# Patient Record
Sex: MALE | Race: WHITE | NOT HISPANIC OR LATINO | Employment: UNEMPLOYED | URBAN - METROPOLITAN AREA
[De-identification: names, ages, dates, MRNs, and addresses within clinical notes are randomized per-mention and may not be internally consistent; named-entity substitution may affect disease eponyms.]

---

## 2018-04-18 ENCOUNTER — OFFICE VISIT (OUTPATIENT)
Dept: URGENT CARE | Facility: CLINIC | Age: 33
End: 2018-04-18

## 2018-04-18 VITALS
OXYGEN SATURATION: 97 % | DIASTOLIC BLOOD PRESSURE: 68 MMHG | TEMPERATURE: 98.9 F | WEIGHT: 140 LBS | RESPIRATION RATE: 18 BRPM | HEART RATE: 110 BPM | HEIGHT: 72 IN | BODY MASS INDEX: 18.96 KG/M2 | SYSTOLIC BLOOD PRESSURE: 132 MMHG

## 2018-04-18 DIAGNOSIS — J34.0 CELLULITIS OF NOSE: Primary | ICD-10-CM

## 2018-04-18 PROCEDURE — G0382 LEV 3 HOSP TYPE B ED VISIT: HCPCS | Performed by: FAMILY MEDICINE

## 2018-04-18 RX ORDER — CEFTRIAXONE 1 G/1
1000 INJECTION, POWDER, FOR SOLUTION INTRAMUSCULAR; INTRAVENOUS EVERY 24 HOURS
Status: DISCONTINUED | OUTPATIENT
Start: 2018-04-18 | End: 2021-08-19

## 2018-04-18 RX ORDER — SULFAMETHOXAZOLE AND TRIMETHOPRIM 800; 160 MG/1; MG/1
1 TABLET ORAL EVERY 12 HOURS SCHEDULED
Qty: 20 TABLET | Refills: 0 | Status: SHIPPED | OUTPATIENT
Start: 2018-04-19 | End: 2018-04-29

## 2018-04-18 RX ORDER — BUPRENORPHINE HYDROCHLORIDE AND NALOXONE HYDROCHLORIDE DIHYDRATE 8; 2 MG/1; MG/1
TABLET SUBLINGUAL 3 TIMES DAILY
COMMUNITY
Start: 2018-03-27

## 2018-04-18 RX ORDER — DEXTROAMPHETAMINE SACCHARATE, AMPHETAMINE ASPARTATE, DEXTROAMPHETAMINE SULFATE AND AMPHETAMINE SULFATE 7.5; 7.5; 7.5; 7.5 MG/1; MG/1; MG/1; MG/1
30 TABLET ORAL 2 TIMES DAILY
COMMUNITY
Start: 2018-03-19

## 2018-04-18 RX ADMIN — CEFTRIAXONE 1000 MG: 1 INJECTION, POWDER, FOR SOLUTION INTRAMUSCULAR; INTRAVENOUS at 10:45

## 2018-04-18 NOTE — LETTER
April 18, 2018     Patient: Orlando Benitez   YOB: 1985   Date of Visit: 4/18/2018       To Whom it May Concern:    Orlando Benitez was seen in my clinic on 4/18/2018  He may return to work on 4/20/2018  If you have any questions or concerns, please don't hesitate to call           Sincerely,          Nita Beasley DO        CC: No Recipients

## 2018-04-18 NOTE — PATIENT INSTRUCTIONS
Take bactrim as directed  Ceftriaxone injection given in clinic  Return to clinic tomorrow for follow up and recheck  Go to the ER if sx worsen, develop fevers, SOB, trouble breathing, or eye pain

## 2018-04-18 NOTE — PROGRESS NOTES
[address] [employer] [employer] [facility]        NAME: Jesus Granger is a 29 y o  male  : 1985    MRN: [id number]  DATE: 2018  TIME: 10:43 AM    Assessment and Plan   Cellulitis of nose [J34 0]  1  Cellulitis of nose  cefTRIAXone (ROCEPHIN) injection 1,000 mg    sulfamethoxazole-trimethoprim (BACTRIM DS) 800-160 mg per tablet   Patient refused ER for IV abx  Wanted to attempt outpatient treatment with ceftriaxone injection given in clinic as well as bactrim  Vitals stable and patient afebrile  Aware of risk with hx of PCN allergy but patient agreed with benefit outweighing risk as he has never had PCN but was told by his mother as a child he was allergic  Keeping patient in clinic for 20 min after injection for observation  Patient with no SOB, dyspnea, wheezing, tongue swelling, chest pain, facial swelling or hives after 20 minutes  Patient Instructions     Take bactrim as directed  Ceftriaxone injection given in clinic  Return to clinic tomorrow for follow up and recheck  Go to the ER if sx worsen, develop fevers, SOB, trouble breathing, or eye pain  Chief Complaint     Chief Complaint   Patient presents with    Facial Swelling     Pt reports on Monday he noted a black area on his right nare  Last night his nose and right facial area became red, swollen and painful to touch  History of Present Illness       Patient presents with 3 day hx of nasal swelling and tenderness  He states Monday morning he woke up and had a small "pimple" at the right edge of his nose with a black spot in the middle of it  He didn't do anything for it and noticed the swelling was increasing by Monday night  He says the swelling was even worse this morning spreading to his face and under his right eye  He states the pain and swelling has been worsening ever since first noticing the lesion on Monday morning  He has been applying warm compresses to the area with no relief  He admits to headache and photophobia   He denies fevers or chills  He has an allergy to penicillin but is unable to state to what degree as he was told this by his mother and does not recall ever taking penicillin  Review of Systems   Review of Systems   Constitutional: Negative for chills and fever  Eyes: Positive for photophobia  Negative for visual disturbance  Respiratory: Negative for shortness of breath  Skin: Positive for color change  Nose is red and swollen   Neurological: Positive for headaches  Current Medications       Current Outpatient Prescriptions:     amphetamine-dextroamphetamine (ADDERALL) 10 mg tablet, , Disp: , Rfl:     buprenorphine-naloxone (SUBOXONE) 8-2 mg per SL tablet, , Disp: , Rfl:     [START ON 4/19/2018] sulfamethoxazole-trimethoprim (BACTRIM DS) 800-160 mg per tablet, Take 1 tablet by mouth every 12 (twelve) hours for 10 days, Disp: 20 tablet, Rfl: 0    Current Facility-Administered Medications:     cefTRIAXone (ROCEPHIN) injection 1,000 mg, 1,000 mg, Intravenous, Q24H, Panda Crane,     Current Allergies     Allergies as of 04/18/2018 - Reviewed 04/18/2018   Allergen Reaction Noted    Penicillins  04/18/2018            The following portions of the patient's history were reviewed and updated as appropriate: allergies, current medications, past family history, past medical history, past social history, past surgical history and problem list      No past medical history on file  No past surgical history on file  No family history on file  Medications have been verified  Objective   /68   Pulse (!) 110   Temp 98 9 °F (37 2 °C)   Resp 18   Ht 6' (1 829 m)   Wt 63 5 kg (140 lb)   SpO2 97%   BMI 18 99 kg/m²        Physical Exam     Physical Exam   Constitutional: He appears well-developed and well-nourished  No distress  HENT:   Nose with erythema and swelling R>L over external nose and edge of nare   Small area of eschar noted over the anterior edge of the right nostril  Very TTP over the right aspect of nose and into the area below the right eye  Mucosa normal  No pustular formation  Eyes: EOM are normal    Pulmonary/Chest: Effort normal and breath sounds normal  No respiratory distress  He has no wheezes  Post injection: no wheezing, SOB, dyspnea or stridor

## 2018-04-19 ENCOUNTER — OFFICE VISIT (OUTPATIENT)
Dept: URGENT CARE | Facility: CLINIC | Age: 33
End: 2018-04-19

## 2018-04-19 VITALS
OXYGEN SATURATION: 97 % | SYSTOLIC BLOOD PRESSURE: 128 MMHG | HEART RATE: 97 BPM | RESPIRATION RATE: 16 BRPM | TEMPERATURE: 98.7 F | DIASTOLIC BLOOD PRESSURE: 78 MMHG

## 2018-04-19 DIAGNOSIS — L03.211 CELLULITIS OF FACE: Primary | ICD-10-CM

## 2018-04-19 PROCEDURE — 99213 OFFICE O/P EST LOW 20 MIN: CPT | Performed by: FAMILY MEDICINE

## 2018-04-19 NOTE — PATIENT INSTRUCTIONS
Continue with your antibiotic  Warm compresses as needed  Use probiotics while on the antibiotic plus at least another week or 2  Do not hesitate to go to the emergency room if you are worsening

## 2018-04-19 NOTE — PROGRESS NOTES
Assessment/Plan:      Diagnoses and all orders for this visit:    Cellulitis of face          Subjective:     Patient ID: Brittney Pang is a 29 y o  male  This 21-year-old male was seen here yesterday and treated for cellulitis of the face  He believes that it all began on Monday of this week  When, he believes, a spider bit him on the right side of his nose  He noticed some redness on the left nostril on Tuesday  Yesterday the redness had extended up the right side of the nose and to just beneath the right eye  He presented here  He was treated aggressively with IM Rocephin and is now on Bactrim DS b i d  He is here for a recheck  The nurse who administered Rocephin yesterday assures me that he is significantly improved  Review of Systems   Constitutional: Negative  HENT: Negative  Eyes: Negative  Respiratory: Negative  Musculoskeletal: Negative  Skin:        See HPI         Objective:     Physical Exam   Constitutional: He is oriented to person, place, and time  He appears well-developed and well-nourished  HENT:   Head: Normocephalic and atraumatic  Pulmonary/Chest: Effort normal    Neurological: He is alert and oriented to person, place, and time  Skin: Skin is warm  There is an eschar at the a right nostril  Redness and swelling extends up the right side of the no loose and to just below the right eye

## 2021-03-10 ENCOUNTER — APPOINTMENT (OUTPATIENT)
Dept: URGENT CARE | Facility: CLINIC | Age: 36
End: 2021-03-10
Payer: OTHER MISCELLANEOUS

## 2021-03-10 PROCEDURE — G0382 LEV 3 HOSP TYPE B ED VISIT: HCPCS | Performed by: FAMILY MEDICINE

## 2021-03-10 PROCEDURE — 99283 EMERGENCY DEPT VISIT LOW MDM: CPT | Performed by: FAMILY MEDICINE

## 2021-03-12 ENCOUNTER — APPOINTMENT (OUTPATIENT)
Dept: URGENT CARE | Facility: CLINIC | Age: 36
End: 2021-03-12
Payer: OTHER MISCELLANEOUS

## 2021-03-12 PROCEDURE — 99213 OFFICE O/P EST LOW 20 MIN: CPT

## 2021-03-14 ENCOUNTER — OCCMED (OUTPATIENT)
Dept: URGENT CARE | Facility: CLINIC | Age: 36
End: 2021-03-14
Payer: OTHER MISCELLANEOUS

## 2021-03-14 DIAGNOSIS — Z56.89 WORK-RELATED CONDITION: Primary | ICD-10-CM

## 2021-03-14 PROCEDURE — 99213 OFFICE O/P EST LOW 20 MIN: CPT

## 2021-03-31 ENCOUNTER — APPOINTMENT (OUTPATIENT)
Dept: URGENT CARE | Facility: CLINIC | Age: 36
End: 2021-03-31
Payer: OTHER MISCELLANEOUS

## 2021-03-31 DIAGNOSIS — Z23 ENCOUNTER FOR IMMUNIZATION: ICD-10-CM

## 2021-03-31 PROCEDURE — 99213 OFFICE O/P EST LOW 20 MIN: CPT | Performed by: FAMILY MEDICINE

## 2021-04-03 ENCOUNTER — IMMUNIZATIONS (OUTPATIENT)
Dept: FAMILY MEDICINE CLINIC | Facility: HOSPITAL | Age: 36
End: 2021-04-03

## 2021-04-03 DIAGNOSIS — Z23 ENCOUNTER FOR IMMUNIZATION: Primary | ICD-10-CM

## 2021-04-03 PROCEDURE — 91300 SARS-COV-2 / COVID-19 MRNA VACCINE (PFIZER-BIONTECH) 30 MCG: CPT

## 2021-04-03 PROCEDURE — 0001A SARS-COV-2 / COVID-19 MRNA VACCINE (PFIZER-BIONTECH) 30 MCG: CPT

## 2021-04-14 ENCOUNTER — APPOINTMENT (EMERGENCY)
Dept: RADIOLOGY | Facility: HOSPITAL | Age: 36
End: 2021-04-14
Payer: COMMERCIAL

## 2021-04-14 ENCOUNTER — HOSPITAL ENCOUNTER (EMERGENCY)
Facility: HOSPITAL | Age: 36
Discharge: HOME | End: 2021-04-14
Attending: EMERGENCY MEDICINE | Admitting: EMERGENCY MEDICINE
Payer: COMMERCIAL

## 2021-04-14 VITALS
SYSTOLIC BLOOD PRESSURE: 137 MMHG | HEART RATE: 108 BPM | OXYGEN SATURATION: 98 % | RESPIRATION RATE: 18 BRPM | WEIGHT: 160 LBS | BODY MASS INDEX: 21.67 KG/M2 | HEIGHT: 72 IN | TEMPERATURE: 97 F | DIASTOLIC BLOOD PRESSURE: 90 MMHG

## 2021-04-14 DIAGNOSIS — Z20.822 SUSPECTED COVID-19 VIRUS INFECTION: Primary | ICD-10-CM

## 2021-04-14 LAB
FLUAV RNA RESP QL NAA+PROBE: NEGATIVE
FLUBV RNA RESP QL NAA+PROBE: NEGATIVE
RSV RNA RESP QL NAA+PROBE: NEGATIVE
SARS-COV-2 RNA RESP QL NAA+PROBE: NEGATIVE

## 2021-04-14 PROCEDURE — 99284 EMERGENCY DEPT VISIT MOD MDM: CPT | Performed by: PHYSICIAN ASSISTANT

## 2021-04-14 PROCEDURE — 71045 X-RAY EXAM CHEST 1 VIEW: CPT

## 2021-04-14 PROCEDURE — 0241U HB NFCT DS VIR RESP RNA 4 TRGT: CPT | Performed by: PHYSICIAN ASSISTANT

## 2021-04-14 PROCEDURE — 99283 EMERGENCY DEPT VISIT LOW MDM: CPT

## 2021-04-14 NOTE — ED PROVIDER NOTES
History  Chief Complaint   Patient presents with    Generalized Body Aches     Patient states that he has been feeling unwell for the past few days with a fever of 100 9, coughing and back aches     28 yo male w/ no pertinent PMH presents to the Emergency Department for evaluation of generalized myalgia, dry cough, fatigue and mild SOB x 2 days  Pt did receive 1/2 COVID vaccinations 10d ago  Intermittent fevers and chills, has not taken any antipyretics today  No hx of lung disease, does smoke intermittently  Two sons at home, ages 1 and 2, with similar respiratory symptoms  No known COVID + contacts  Prior to Admission Medications   Prescriptions Last Dose Informant Patient Reported? Taking? amphetamine-dextroamphetamine (ADDERALL) 10 mg tablet   Yes No   buprenorphine-naloxone (SUBOXONE) 8-2 mg per SL tablet   Yes No      Facility-Administered Medications Last Administration Doses Remaining   cefTRIAXone (ROCEPHIN) injection 1,000 mg 4/18/2018 10:45 AM           Past Medical History:   Diagnosis Date    History of substance use        History reviewed  No pertinent surgical history  History reviewed  No pertinent family history  I have reviewed and agree with the history as documented  E-Cigarette/Vaping     E-Cigarette/Vaping Substances     Social History     Tobacco Use    Smoking status: Current Every Day Smoker     Packs/day: 0 25     Types: Cigarettes    Smokeless tobacco: Never Used   Substance Use Topics    Alcohol use: No    Drug use: No       Review of Systems   Constitutional: Positive for fatigue and fever  Negative for chills and diaphoresis  Eyes: Negative for visual disturbance  Respiratory: Positive for cough and shortness of breath  Cardiovascular: Negative for chest pain and palpitations  Gastrointestinal: Negative for abdominal pain, diarrhea, nausea and vomiting  Genitourinary: Negative for dysuria, flank pain and frequency     Musculoskeletal: Positive for arthralgias and myalgias  Skin: Negative for color change, rash and wound  Allergic/Immunologic: Negative for immunocompromised state  Neurological: Negative for dizziness and light-headedness  Hematological: Does not bruise/bleed easily  Psychiatric/Behavioral: Negative for confusion  The patient is not nervous/anxious  Physical Exam  Physical Exam  Vitals signs and nursing note reviewed  Constitutional:       Appearance: He is well-developed  HENT:      Head: Normocephalic and atraumatic  Mouth/Throat:      Mouth: Mucous membranes are moist    Eyes:      Conjunctiva/sclera: Conjunctivae normal    Neck:      Musculoskeletal: Neck supple  Cardiovascular:      Rate and Rhythm: Regular rhythm  Tachycardia present  Heart sounds: No murmur  Pulmonary:      Effort: Pulmonary effort is normal  No respiratory distress  Breath sounds: Normal breath sounds  Abdominal:      Palpations: Abdomen is soft  Tenderness: There is no abdominal tenderness  Skin:     General: Skin is warm and dry  Capillary Refill: Capillary refill takes less than 2 seconds  Neurological:      Mental Status: He is alert  Psychiatric:         Mood and Affect: Mood normal          Behavior: Behavior normal          Vital Signs  ED Triage Vitals [04/14/21 1424]   Temperature Pulse Respirations Blood Pressure SpO2   (!) 97 °F (36 1 °C) (!) 108 18 137/90 98 %      Temp src Heart Rate Source Patient Position - Orthostatic VS BP Location FiO2 (%)   -- -- Sitting Right arm --      Pain Score       --           Vitals:    04/14/21 1424   BP: 137/90   Pulse: (!) 108   Patient Position - Orthostatic VS: Sitting         Visual Acuity      ED Medications  Medications - No data to display    Diagnostic Studies  Results Reviewed     Procedure Component Value Units Date/Time    COVID19, [id number] Collected: 04/14/21 1451    Lab Status:  In process Specimen: Nares from Nasopharyngeal Swab Updated: 04/14/21 1458                 XR chest 1 view portable   ED Interpretation by Nahun Caicedo PA-C (04/14 6049)   No acute disease      Final Result by Alfred Arroyo MD (04/14 3319)      No acute cardiopulmonary disease  Workstation performed: [id number]                    Procedures  Procedures         ED Course                             SBIRT 22yo+      Most Recent Value   SBIRT (25 yo +)   In order to provide better care to our patients, we are screening all of our patients for alcohol and drug use  Would it be okay to ask you these screening questions? No Filed at: 04/14/2021 1427                    [facility]  Number of Diagnoses or Management Options  Suspected COVID-19 virus infection: new and requires workup  Diagnosis management comments: CXR unremarkable, he is mildly tachycardic however VS otherwise unremarkable  Likely viral etiology given the multiple sick contacts within his home  Amount and/or Complexity of Data Reviewed  Tests in the radiology section of CPT®: ordered and reviewed  Review and summarize past medical records: yes  Independent visualization of images, tracings, or specimens: yes        Disposition  Final diagnoses:   Suspected COVID-19 virus infection     Time reflects when diagnosis was documented in both MDM as applicable and the Disposition within this note     Time User Action Codes Description Comment    4/14/2021  2:50 PM Chantell Page Add [Z13 902] Suspected COVID-19 virus infection       ED Disposition     ED Disposition Condition Date/Time Comment    Discharge Stable Wed Apr 14, 2021  2:50 PM Samy Tran discharge to home/self care              Follow-up Information     Follow up With Specialties Details Why Contact Info Additional Information     [address] Emergency Department Emergency Medicine  If symptoms worsen [address] [place],[address] [id number] [phone] [facility] Emergency Department, [address] [place], [place] [address]d Jeremy [address]    Timo Griffiths MD Obstetrics and Gynecology   [address]  [phone]             Discharge Medication List as of 4/14/2021  2:50 PM      CONTINUE these medications which have NOT CHANGED    Details   amphetamine-dextroamphetamine (ADDERALL) 10 mg tablet Starting Mon 3/19/2018, Historical Med      buprenorphine-naloxone (SUBOXONE) 8-2 mg per SL tablet Starting Tue 3/27/2018, Historical Med           No discharge procedures on file      PDMP Review     None          ED Provider  Electronically Signed by           Fariba Borjas PA-C  04/14/21 7389

## 2021-04-14 NOTE — DISCHARGE INSTRUCTIONS

## 2021-04-20 ENCOUNTER — APPOINTMENT (OUTPATIENT)
Dept: RADIOLOGY | Facility: CLINIC | Age: 36
End: 2021-04-20
Payer: COMMERCIAL

## 2021-04-20 VITALS
HEIGHT: 72 IN | WEIGHT: 154 LBS | DIASTOLIC BLOOD PRESSURE: 82 MMHG | SYSTOLIC BLOOD PRESSURE: 130 MMHG | BODY MASS INDEX: 20.86 KG/M2 | TEMPERATURE: 97.8 F

## 2021-04-20 DIAGNOSIS — M25.511 ACUTE PAIN OF RIGHT SHOULDER: ICD-10-CM

## 2021-04-20 DIAGNOSIS — M77.8 RIGHT SHOULDER TENDONITIS: Primary | ICD-10-CM

## 2021-04-20 DIAGNOSIS — M25.511 RIGHT SHOULDER PAIN, UNSPECIFIED CHRONICITY: ICD-10-CM

## 2021-04-20 PROCEDURE — 99203 OFFICE O/P NEW LOW 30 MIN: CPT | Performed by: ORTHOPAEDIC SURGERY

## 2021-04-20 PROCEDURE — 20610 DRAIN/INJ JOINT/BURSA W/O US: CPT | Performed by: ORTHOPAEDIC SURGERY

## 2021-04-20 PROCEDURE — 73030 X-RAY EXAM OF SHOULDER: CPT

## 2021-04-20 RX ORDER — BETAMETHASONE SODIUM PHOSPHATE AND BETAMETHASONE ACETATE 3; 3 MG/ML; MG/ML
6 INJECTION, SUSPENSION INTRA-ARTICULAR; INTRALESIONAL; INTRAMUSCULAR; SOFT TISSUE
Status: COMPLETED | OUTPATIENT
Start: 2021-04-20 | End: 2021-04-20

## 2021-04-20 RX ORDER — LIDOCAINE HYDROCHLORIDE 10 MG/ML
5 INJECTION, SOLUTION EPIDURAL; INFILTRATION; INTRACAUDAL; PERINEURAL
Status: COMPLETED | OUTPATIENT
Start: 2021-04-20 | End: 2021-04-20

## 2021-04-20 RX ADMIN — LIDOCAINE HYDROCHLORIDE 5 ML: 10 INJECTION, SOLUTION EPIDURAL; INFILTRATION; INTRACAUDAL; PERINEURAL at 10:48

## 2021-04-20 RX ADMIN — BETAMETHASONE SODIUM PHOSPHATE AND BETAMETHASONE ACETATE 6 MG: 3; 3 INJECTION, SUSPENSION INTRA-ARTICULAR; INTRALESIONAL; INTRAMUSCULAR; SOFT TISSUE at 10:48

## 2021-04-20 NOTE — ASSESSMENT & PLAN NOTE
After reviewing his imaging and performing a thorough history and physical exam, I explained to Jhonny Daugherty that he does appear to be suffering with right shoulder tendinitis  We reviewed his MRI together and I explained that his rotator cuff, biceps tendon, and labrum appear intact, but he does have some degenerative cyst developing over the anterior superior aspect of the humerus head which most consistent with his prior intermittent impingement symptoms  I did also offer him a corticosteroid injection for symptomatic relief  After discussing the risks and benefits, he elected to proceed  The injection was administered as documented below and was well tolerated by the patient  Patient appeared to have good pain relief after the injection  Post injection instructions were provided  We discussed treatment options and I recommended initiating formal therapy  I provided him with a referral for this today in the office  I also recommended he continue using over-the-counter NSAIDs and Voltaren gel as needed for pain  He may continue working on light duty status but should avoid repetitive right upper extremity activities, particularly overhead activities  A work note was provided for his employer  We will see him back in 4 weeks for repeat clinical evaluation  All patient's questions were answered to his satisfaction  This note is created using dictation transcription  It may contain typographical errors, grammatical errors, improperly dictated words, background noise and other errors

## 2021-04-20 NOTE — LETTER
April 20, 2021     Patient: Aury Kasper   YOB: 1985   Date of Visit: 4/20/2021       To Whom it May Concern:    Aury Kasper is under my professional care  He was seen in my office on 4/20/2021  He  may continue working on light duty status but should avoid repetitive activity his right upper extremity, especially overhead activity  If you have any questions or concerns, please don't hesitate to call           Sincerely,          Ivan Tyson MD        CC: No Recipients

## 2021-04-25 ENCOUNTER — IMMUNIZATIONS (OUTPATIENT)
Dept: FAMILY MEDICINE CLINIC | Facility: HOSPITAL | Age: 36
End: 2021-04-25

## 2021-04-25 DIAGNOSIS — Z23 ENCOUNTER FOR IMMUNIZATION: Primary | ICD-10-CM

## 2021-04-25 PROCEDURE — 0002A SARS-COV-2 / COVID-19 MRNA VACCINE (PFIZER-BIONTECH) 30 MCG: CPT

## 2021-04-25 PROCEDURE — 91300 SARS-COV-2 / COVID-19 MRNA VACCINE (PFIZER-BIONTECH) 30 MCG: CPT

## 2021-05-18 ENCOUNTER — OFFICE VISIT (OUTPATIENT)
Dept: URGENT CARE | Facility: CLINIC | Age: 36
End: 2021-05-18
Payer: COMMERCIAL

## 2021-05-18 VITALS
BODY MASS INDEX: 21.67 KG/M2 | HEART RATE: 91 BPM | SYSTOLIC BLOOD PRESSURE: 137 MMHG | OXYGEN SATURATION: 98 % | TEMPERATURE: 97.8 F | DIASTOLIC BLOOD PRESSURE: 79 MMHG | RESPIRATION RATE: 16 BRPM | WEIGHT: 160 LBS | HEIGHT: 72 IN

## 2021-05-18 DIAGNOSIS — S61.219A LACERATION WITHOUT FOREIGN BODY OF UNSPECIFIED FINGER WITHOUT DAMAGE TO NAIL, INITIAL ENCOUNTER: Primary | ICD-10-CM

## 2021-05-18 PROCEDURE — 90715 TDAP VACCINE 7 YRS/> IM: CPT

## 2021-05-18 PROCEDURE — 90471 IMMUNIZATION ADMIN: CPT | Performed by: PHYSICIAN ASSISTANT

## 2021-05-18 PROCEDURE — G0382 LEV 3 HOSP TYPE B ED VISIT: HCPCS | Performed by: PHYSICIAN ASSISTANT

## 2021-05-18 PROCEDURE — 12001 RPR S/N/AX/GEN/TRNK 2.5CM/<: CPT | Performed by: PHYSICIAN ASSISTANT

## 2021-05-18 RX ORDER — CLINDAMYCIN HYDROCHLORIDE 300 MG/1
300 CAPSULE ORAL 4 TIMES DAILY
Qty: 40 CAPSULE | Refills: 0 | Status: SHIPPED | OUTPATIENT
Start: 2021-05-18 | End: 2021-05-28

## 2021-05-18 NOTE — PROGRESS NOTES
[id number]        NAME: Jo Alamo is a 29 y o  male  : 1985    MRN: [id number]  DATE: May 18, 2021  TIME: 4:10 PM    /79   Pulse 91   Temp 97 8 °F (36 6 °C) (Tympanic)   Resp 16   Ht 6' (1 829 m)   Wt 72 6 kg (160 lb)   SpO2 98%   BMI 21 70 kg/m²     Assessment and Plan   Laceration without foreign body of unspecified finger without damage to nail, initial encounter [S61 219A]  1  Laceration without foreign body of unspecified finger without damage to nail, initial encounter  TDAP Vaccine greater than or equal to 6yo    clindamycin (CLEOCIN) 300 MG capsule         Patient Instructions       Follow up with PCP in 3-5 days  Proceed to  ER if symptoms worsen  Chief Complaint     Chief Complaint   Patient presents with    Finger Laceration     Pt reports lacerating left thumb on metal at [facility] today around 11:30AM  Last Tetanus unknown  History of Present Illness       Pt with left thumb laceration from sharp metal edge at store shelf      Review of Systems   Review of Systems   Constitutional: Negative  HENT: Negative  Eyes: Negative  Respiratory: Negative  Cardiovascular: Negative  Gastrointestinal: Negative  Endocrine: Negative  Genitourinary: Negative  Musculoskeletal: Negative  Skin: Negative  Allergic/Immunologic: Negative  Neurological: Negative  Hematological: Negative  Psychiatric/Behavioral: Negative  All other systems reviewed and are negative          Current Medications       Current Outpatient Medications:     amphetamine-dextroamphetamine (ADDERALL) 10 mg tablet, 30 mg , Disp: , Rfl:     buprenorphine-naloxone (SUBOXONE) 8-2 mg per SL tablet, 3 (three) times a day , Disp: , Rfl:     clindamycin (CLEOCIN) 300 MG capsule, Take 1 capsule (300 mg total) by mouth 4 (four) times a day for 10 days, Disp: 40 capsule, Rfl: 0    Current Facility-Administered Medications:     cefTRIAXone (ROCEPHIN) injection 1,000 mg, 1,000 mg, Intravenous, Q24H, Sangeeta Sauer DO, 1,000 mg at 04/18/18 1045    Current Allergies     Allergies as of 05/18/2021 - Reviewed 05/18/2021   Allergen Reaction Noted    Penicillins  04/18/2018            The following portions of the patient's history were reviewed and updated as appropriate: allergies, current medications, past family history, past medical history, past social history, past surgical history and problem list      Past Medical History:   Diagnosis Date    History of substance use        History reviewed  No pertinent surgical history  Family History   Problem Relation Age of Onset    Diabetes Mother     Hypertension Mother     Brain cancer Mother          Medications have been verified  Objective   /79   Pulse 91   Temp 97 8 °F (36 6 °C) (Tympanic)   Resp 16   Ht 6' (1 829 m)   Wt 72 6 kg (160 lb)   SpO2 98%   BMI 21 70 kg/m²          Physical Exam     Physical Exam  Vitals signs and nursing note reviewed  Constitutional:       Appearance: Normal appearance  He is normal weight  HENT:      Head: Normocephalic and atraumatic  Right Ear: Tympanic membrane, ear canal and external ear normal       Left Ear: Tympanic membrane, ear canal and external ear normal       Nose: Nose normal       Mouth/Throat:      Mouth: Mucous membranes are moist       Pharynx: Oropharynx is clear  Eyes:      Extraocular Movements: Extraocular movements intact  Conjunctiva/sclera: Conjunctivae normal       Pupils: Pupils are equal, round, and reactive to light  Neck:      Musculoskeletal: Normal range of motion  Cardiovascular:      Rate and Rhythm: Normal rate and regular rhythm  Pulses: Normal pulses  Heart sounds: Normal heart sounds  Pulmonary:      Effort: Pulmonary effort is normal       Breath sounds: Normal breath sounds  Abdominal:      General: Bowel sounds are normal       Palpations: Abdomen is soft  Musculoskeletal: Normal range of motion  Comments: Left thumb   25cm laceration superficial  Dip joint  Distal neuro and vascular wnl   Skin:     General: Skin is warm  Capillary Refill: Capillary refill takes less than 2 seconds  Neurological:      General: No focal deficit present  Mental Status: He is alert and oriented to person, place, and time  Psychiatric:         Mood and Affect: Mood normal          Behavior: Behavior normal            Laceration repair    Date/Time: 5/18/2021 4:08 PM  Performed by: Jori Beaulieu PA-C  Authorized by: Jori Beaulieu PA-C   Consent: Verbal consent obtained  Written consent not obtained  Consent given by: patient  Body area: upper extremity  Location details: left thumb  Laceration length: 0 3 cm  Foreign bodies: no foreign bodies  Tendon involvement: none  Nerve involvement: none  Vascular damage: no  Anesthesia: local infiltration    Anesthesia:  Local Anesthetic: lidocaine 1% without epinephrine  Anesthetic total: 1 mL    Sedation:  Patient sedated: no      Wound Dehiscence:  Superficial Wound Dehiscence: simple closure      Procedure Details:  Preparation: Patient was prepped and draped in the usual sterile fashion    Irrigation solution: saline  Irrigation method: syringe  Amount of cleaning: extensive  Debridement: none  Degree of undermining: none  Skin closure: 4-0 nylon  Number of sutures: 2  Technique: simple  Approximation: close  Approximation difficulty: simple  Dressing: 4x4 sterile gauze  Patient tolerance: patient tolerated the procedure well with no immediate complications

## 2021-05-18 NOTE — PATIENT INSTRUCTIONS
Finger Laceration   WHAT YOU NEED TO KNOW:   A finger laceration is a deep cut in your skin  Your blood vessels, bones, joints, tendons, or nerves may also be injured  DISCHARGE INSTRUCTIONS:   Return to the emergency department if:   · Your wound comes apart  · Blood soaks through your bandage  · You have severe pain in your finger or hand  · Your finger is pale and cold  · You have sudden trouble moving your finger  · Your swelling suddenly gets worse  · You have red streaks on your skin coming from your wound  Call your doctor or hand specialist if:   · You have new numbness or tingling  · Your finger feels warm, looks swollen or red, and is draining pus  · You have a fever  · You have questions or concerns about your condition or care  Medicines: You may  need any of the following:  · Antibiotics  help prevent a bacterial infection  · Acetaminophen  decreases pain and fever  It is available without a doctor's order  Ask how much to take and how often to take it  Follow directions  Read the labels of all other medicines you are using to see if they also contain acetaminophen, or ask your doctor or pharmacist  Acetaminophen can cause liver damage if not taken correctly  Do not use more than 4 grams (4,000 milligrams) total of acetaminophen in one day  · Prescription pain medicine  may be given  Ask your healthcare provider how to take this medicine safely  Some prescription pain medicines contain acetaminophen  Do not take other medicines that contain acetaminophen without talking to your healthcare provider  Too much acetaminophen may cause liver damage  Prescription pain medicine may cause constipation  Ask your healthcare provider how to prevent or treat constipation  · Take your medicine as directed  Contact your healthcare provider if you think your medicine is not helping or if you have side effects  Tell him or her if you are allergic to any medicine   Keep a list of the medicines, vitamins, and herbs you take  Include the amounts, and when and why you take them  Bring the list or the pill bottles to follow-up visits  Carry your medicine list with you in case of an emergency  Self-care:   · Apply ice  on your finger for 15 to 20 minutes every hour or as directed  Use an ice pack, or put crushed ice in a plastic bag  Cover it with a towel before you apply it to your skin  Ice helps prevent tissue damage and decreases swelling and pain  · Elevate  your hand above the level of your heart as often as you can  This will help decrease swelling and pain  Prop your hand on pillows or blankets to keep it elevated comfortably  · Wear your splint as directed  A splint will decrease movement and stress on your wound  The splint may help your wound heal faster  Ask your healthcare provider how to apply and remove a splint  · Apply ointments to decrease scarring  Do not apply ointments until your healthcare provider says it is okay  You may need to wait until your wound is healed  Ask which ointment to buy and how often to use it  Wound care:   · Do not get your wound wet until your healthcare provider says it is okay  Do not soak your hand in water  Do not go swimming until your healthcare provider says it is okay  When your healthcare provider says it is okay, carefully wash around the wound with soap and water  Let soap and water run over your wound  Gently pat the area dry or allow it to air dry  · Change your bandages when they get wet, dirty, or after washing  Apply new, clean bandages as directed  Do not apply elastic bandages or tape too tightly  Do not put powders or lotions on your wound  · Apply antibiotic ointment as directed  Your healthcare provider may give you antibiotic ointment to put over your wound if you have stitches  If you have Strips-Strips over your wound, let them dry up and fall off on their own   If they do not fall off within 14 days, gently remove them  If you have glue over your wound, do not remove or pick at it  If your glue comes off, do not replace it with glue that you have at home  · Check your wound every day for signs of infection  Signs of infection include swelling, redness, or pus  Follow up with your doctor or hand specialist in 2 days:  Write down your questions so you remember to ask them during your visits  © Copyright [address] Information is for End User's use only and may not be sold, redistributed or otherwise used for commercial purposes  All illustrations and images included in CareNotes® are the copyrighted property of [employer]  or [address] [place], [place] [address]   The above information is an  only  It is not intended as medical advice for individual conditions or treatments  Talk to your doctor, nurse or pharmacist before following any medical regimen to see if it is safe and effective for you

## 2021-06-09 ENCOUNTER — TELEPHONE (OUTPATIENT)
Dept: OBGYN CLINIC | Facility: OTHER | Age: 36
End: 2021-06-09

## 2021-06-09 NOTE — TELEPHONE ENCOUNTER
Harley Patton @ [facility] if requesting we fax Office Notes from 04-20      Fax # [phone]  Attn : Lawson Gant # [phone] , Harley Patton

## 2021-06-15 ENCOUNTER — OFFICE VISIT (OUTPATIENT)
Dept: OBGYN CLINIC | Facility: CLINIC | Age: 36
End: 2021-06-15
Payer: OTHER MISCELLANEOUS

## 2021-06-15 VITALS
DIASTOLIC BLOOD PRESSURE: 80 MMHG | BODY MASS INDEX: 21.26 KG/M2 | WEIGHT: 157 LBS | SYSTOLIC BLOOD PRESSURE: 130 MMHG | HEIGHT: 72 IN

## 2021-06-15 DIAGNOSIS — M77.8 RIGHT SHOULDER TENDONITIS: Primary | ICD-10-CM

## 2021-06-15 PROCEDURE — 99213 OFFICE O/P EST LOW 20 MIN: CPT | Performed by: ORTHOPAEDIC SURGERY

## 2021-06-15 NOTE — PROGRESS NOTES
Assessment:     1  Right shoulder tendonitis        Plan:  The patient was seen and examined by Dr Kiel Madrigal and myself  Problem List Items Addressed This Visit        Musculoskeletal and Integument    Right shoulder tendonitis - Primary       Findings consistent with resolved right shoulder tendinitis  Findings and treatment options were discussed with the patient  Patient is doing excellent  He has no pain with activities  He is doing his normal job with no issues  Continue full duty and follow up as needed if any problems arise  All questions were answered to patient's satisfaction  Subjective:      Patient ID: Jeffrey Mcclure is a 29 y o  male  Chief Complaint:    Meche Hill is a 64-year-old male who is following up for right shoulder tendinitis This is a worker's compensation case  He states that on March 7 he felt a pop in his shoulder while lifting heavy metal   He was evaluated 2 months ago and received a subacromial cortisone injection  He felt complete relief with the injection and continues to feel no pain  He did not attend formal physical therapy due to his work schedule  He has been working full duty with no increased pain  He has no pain with his normal daily activities as well  Allergy:  Allergies   Allergen Reactions    Penicillins      Medications:  all current active meds have been reviewed  Past Medical History:  Past Medical History:   Diagnosis Date    History of substance use      Past Surgical History:  History reviewed  No pertinent surgical history    Family History:  Family History   Problem Relation Age of Onset    Diabetes Mother     Hypertension Mother     Brain cancer Mother      Social History:  Social History     Substance and Sexual Activity   Alcohol Use No     Social History     Substance and Sexual Activity   Drug Use No     Social History     Tobacco Use   Smoking Status Current Every Day Smoker    Packs/day: 0 25    Types: Cigarettes   Smokeless Tobacco Never Used     Review of Systems   Constitutional: Positive for activity change  Negative for chills, fever and unexpected weight change  HENT: Negative for hearing loss, nosebleeds and sore throat  Eyes: Negative for pain, redness and visual disturbance  Respiratory: Negative for cough, shortness of breath and wheezing  Cardiovascular: Negative for chest pain, palpitations and leg swelling  Gastrointestinal: Negative for abdominal pain, nausea and vomiting  Endocrine: Negative for polyphagia and polyuria  Genitourinary: Negative for dysuria and hematuria  Musculoskeletal: Positive for arthralgias (Right shoulder) and myalgias  Negative for joint swelling and neck pain  See HPI   Skin: Negative for rash and wound  Neurological: Negative for dizziness, numbness and headaches  Psychiatric/Behavioral: Negative for decreased concentration and suicidal ideas  The patient is not nervous/anxious  Objective:  BP Readings from Last 1 Encounters:   06/15/21 130/80      Wt Readings from Last 1 Encounters:   06/15/21 71 2 kg (157 lb)      BMI:   Estimated body mass index is 21 29 kg/m² as calculated from the following:    Height as of this encounter: 6' (1 829 m)  Weight as of this encounter: 71 2 kg (157 lb)  BSA:   Estimated body surface area is 1 92 meters squared as calculated from the following:    Height as of this encounter: 6' (1 829 m)  Weight as of this encounter: 71 2 kg (157 lb)  Physical Exam  Vitals and nursing note reviewed  Constitutional:       Appearance: Normal appearance  He is well-developed  HENT:      Head: Normocephalic and atraumatic  Right Ear: External ear normal       Left Ear: External ear normal    Eyes:      Extraocular Movements: Extraocular movements intact  Conjunctiva/sclera: Conjunctivae normal    Pulmonary:      Effort: Pulmonary effort is normal    Musculoskeletal:      Cervical back: Neck supple     Skin:     General: Skin is warm  Neurological:      Mental Status: He is alert and oriented to person, place, and time  Psychiatric:         Mood and Affect: Mood normal          Behavior: Behavior normal        Right Shoulder Exam     Tenderness   The patient is experiencing no tenderness  Range of Motion   The patient has normal right shoulder ROM  Muscle Strength   Abduction: 5/5   Internal rotation: 5/5   External rotation: 5/5   Supraspinatus: 5/5   Biceps: 5/5     Tests   Impingement: negative  Drop arm: negative    Other   Erythema: absent  Scars: absent  Sensation: normal  Pulse: present    Comments:  Negative Speeds and empty can    No pain with resisted strength testing                No new imaging           Procedures

## 2021-06-15 NOTE — ASSESSMENT & PLAN NOTE
Findings consistent with resolved right shoulder tendinitis  Findings and treatment options were discussed with the patient  Patient is doing excellent  He has no pain with activities  He is doing his normal job with no issues  Continue full duty and follow up as needed if any problems arise  All questions were answered to patient's satisfaction

## 2021-06-16 ENCOUNTER — HOSPITAL ENCOUNTER (EMERGENCY)
Facility: HOSPITAL | Age: 36
Discharge: HOME | End: 2021-06-16
Attending: EMERGENCY MEDICINE | Admitting: EMERGENCY MEDICINE
Payer: COMMERCIAL

## 2021-06-16 VITALS
HEIGHT: 72 IN | RESPIRATION RATE: 16 BRPM | DIASTOLIC BLOOD PRESSURE: 80 MMHG | WEIGHT: 162 LBS | HEART RATE: 96 BPM | TEMPERATURE: 98 F | SYSTOLIC BLOOD PRESSURE: 124 MMHG | BODY MASS INDEX: 21.94 KG/M2 | OXYGEN SATURATION: 97 %

## 2021-06-16 DIAGNOSIS — M70.51 SUPRAPATELLAR BURSITIS OF RIGHT KNEE: Primary | ICD-10-CM

## 2021-06-16 LAB
ANION GAP SERPL CALCULATED.3IONS-SCNC: 6 MMOL/L (ref 4–13)
BASOPHILS # BLD AUTO: 0.02 THOUSANDS/ΜL (ref 0–0.1)
BASOPHILS NFR BLD AUTO: 0 % (ref 0–1)
BUN SERPL-MCNC: 11 MG/DL (ref 5–25)
CALCIUM SERPL-MCNC: 9.1 MG/DL (ref 8.3–10.1)
CHLORIDE SERPL-SCNC: 101 MMOL/L (ref 100–108)
CO2 SERPL-SCNC: 31 MMOL/L (ref 21–32)
CREAT SERPL-MCNC: 0.67 MG/DL (ref 0.6–1.3)
EOSINOPHIL # BLD AUTO: 0.14 THOUSAND/ΜL (ref 0–0.61)
EOSINOPHIL NFR BLD AUTO: 2 % (ref 0–6)
ERYTHROCYTE [DISTWIDTH] IN BLOOD BY AUTOMATED COUNT: 12.3 % (ref 11.6–15.1)
GFR SERPL CREATININE-BSD FRML MDRD: 124 ML/MIN/1.73SQ M
GLUCOSE SERPL-MCNC: 98 MG/DL (ref 65–140)
HCT VFR BLD AUTO: 36 % (ref 36.5–49.3)
HGB BLD-MCNC: 11.7 G/DL (ref 12–17)
IMM GRANULOCYTES # BLD AUTO: 0.02 THOUSAND/UL (ref 0–0.2)
IMM GRANULOCYTES NFR BLD AUTO: 0 % (ref 0–2)
LYMPHOCYTES # BLD AUTO: 1.91 THOUSANDS/ΜL (ref 0.6–4.47)
LYMPHOCYTES NFR BLD AUTO: 23 % (ref 14–44)
MCH RBC QN AUTO: 29.5 PG (ref 26.8–34.3)
MCHC RBC AUTO-ENTMCNC: 32.5 G/DL (ref 31.4–37.4)
MCV RBC AUTO: 91 FL (ref 82–98)
MONOCYTES # BLD AUTO: 0.92 THOUSAND/ΜL (ref 0.17–1.22)
MONOCYTES NFR BLD AUTO: 11 % (ref 4–12)
NEUTROPHILS # BLD AUTO: 5.44 THOUSANDS/ΜL (ref 1.85–7.62)
NEUTS SEG NFR BLD AUTO: 64 % (ref 43–75)
NRBC BLD AUTO-RTO: 0 /100 WBCS
PLATELET # BLD AUTO: 193 THOUSANDS/UL (ref 149–390)
PMV BLD AUTO: 9.5 FL (ref 8.9–12.7)
POTASSIUM SERPL-SCNC: 3.7 MMOL/L (ref 3.5–5.3)
RBC # BLD AUTO: 3.96 MILLION/UL (ref 3.88–5.62)
SODIUM SERPL-SCNC: 138 MMOL/L (ref 136–145)
URATE SERPL-MCNC: 3.7 MG/DL (ref 4.2–8)
WBC # BLD AUTO: 8.45 THOUSAND/UL (ref 4.31–10.16)

## 2021-06-16 PROCEDURE — 99284 EMERGENCY DEPT VISIT MOD MDM: CPT | Performed by: EMERGENCY MEDICINE

## 2021-06-16 PROCEDURE — 80048 BASIC METABOLIC PNL TOTAL CA: CPT | Performed by: EMERGENCY MEDICINE

## 2021-06-16 PROCEDURE — 99283 EMERGENCY DEPT VISIT LOW MDM: CPT

## 2021-06-16 PROCEDURE — 85025 COMPLETE CBC W/AUTO DIFF WBC: CPT | Performed by: EMERGENCY MEDICINE

## 2021-06-16 PROCEDURE — 36415 COLL VENOUS BLD VENIPUNCTURE: CPT | Performed by: EMERGENCY MEDICINE

## 2021-06-16 PROCEDURE — 84550 ASSAY OF BLOOD/URIC ACID: CPT | Performed by: EMERGENCY MEDICINE

## 2021-06-16 PROCEDURE — 96374 THER/PROPH/DIAG INJ IV PUSH: CPT

## 2021-06-16 RX ORDER — KETOROLAC TROMETHAMINE 30 MG/ML
30 INJECTION, SOLUTION INTRAMUSCULAR; INTRAVENOUS ONCE
Status: COMPLETED | OUTPATIENT
Start: 2021-06-16 | End: 2021-06-16

## 2021-06-16 RX ORDER — DICLOFENAC SODIUM AND MISOPROSTOL 75; 200 MG/1; UG/1
1 TABLET, DELAYED RELEASE ORAL 2 TIMES DAILY PRN
Qty: 30 TABLET | Refills: 0 | Status: SHIPPED | OUTPATIENT
Start: 2021-06-16 | End: 2021-08-19 | Stop reason: ALTCHOICE

## 2021-06-16 RX ADMIN — KETOROLAC TROMETHAMINE 30 MG: 30 INJECTION, SOLUTION INTRAMUSCULAR; INTRAVENOUS at 21:20

## 2021-06-16 NOTE — Clinical Note
Radha Diaz was seen and treated in our emergency department on 6/16/2021  No work until cleared by Orthopedics    Diagnosis:     Yesenia Sparks    He may return on this date: If you have any questions or concerns, please don't hesitate to call        Meng Jenkins, DO    ______________________________           _______________          _______________  Hospital Representative                              Date                                Time

## 2021-06-17 NOTE — ED PROVIDER NOTES
History  Chief Complaint   Patient presents with    Knee Swelling     Pt reports right knee swelling since monday, denies injury  took naproxen @ 1200 today  29y M here for evaluation of right knee pain  Denies any falls or injuries  First noted on Monday w/ some pain/swelling that increased a little throughout th eday  Pt works on his feet for most of a 12h shift w/ frequent up/down steps  Took some naproxen and felt better in the am   Again pain increased by the evening  Tried icing and took naproxen and again was better in the am   When it again increased throughout today, decided to come in for evaluation  Denies injury or inciting event  Doesn't recall hearing/feeling a pop  Does report generalized feeling of instability w/ stairs, but hasn't actually given out on him  Noted some faint redness and warmth above the knee tonight along w/ the swelling  Pain relatively constant all day, worse w/ weight bearing  Pain achy w/ increased pain w/ activity  No radiation of the pain  Some relief w/ naproxen  Denies any numbness or weakness, denies any hip or ankle pain  Denies previous injuries/surgeries to knee  Denies ivda or hx of mrsa  Pt currently on suboxone maintenance therapy tid for a hx of opiate abuse (pain meds)  No hx of gout or pseudo gout        History provided by:  Patient   used: No    Knee Pain  Location:  Knee  Time since incident:  3 days  Injury: no    Knee location:  R knee  Pain details:     Quality:  Aching and shooting    Radiates to:  Does not radiate    Severity:  Severe    Onset quality:  Gradual    Timing:  Constant    Progression:  Waxing and waning  Chronicity:  New  Dislocation: no    Prior injury to area:  No  Relieved by:  Nothing  Worsened by:  Bearing weight and activity  Ineffective treatments:  NSAIDs  Associated symptoms: decreased ROM and swelling    Associated symptoms: no back pain, no fatigue, no fever, no itching, no muscle weakness, no neck pain, no numbness, no stiffness and no tingling    Risk factors: no frequent fractures, no known bone disorder and no obesity        Prior to Admission Medications   Prescriptions Last Dose Informant Patient Reported? Taking? amphetamine-dextroamphetamine (ADDERALL) 10 mg tablet 2021 at Unknown time  Yes Yes   Si mg 2 (two) times a day    buprenorphine-naloxone (SUBOXONE) 8-2 mg per SL tablet 2021 at Unknown time  Yes Yes   Sig: 3 (three) times a day       Facility-Administered Medications Last Administration Doses Remaining   cefTRIAXone (ROCEPHIN) injection 1,000 mg 2018 10:45 AM           Past Medical History:   Diagnosis Date    History of substance use        History reviewed  No pertinent surgical history  Family History   Problem Relation Age of Onset    Diabetes Mother     Hypertension Mother     Brain cancer Mother      I have reviewed and agree with the history as documented  E-Cigarette/Vaping    E-Cigarette Use Never User      E-Cigarette/Vaping Substances     Social History     Tobacco Use    Smoking status: Current Every Day Smoker     Packs/day: 0 25     Types: Cigarettes    Smokeless tobacco: Never Used   Vaping Use    Vaping Use: Never used   Substance Use Topics    Alcohol use: No    Drug use: No       Review of Systems   Constitutional: Negative for fatigue and fever  Musculoskeletal: Negative for back pain, neck pain and stiffness  Skin: Negative for itching  All other systems reviewed and are negative  Physical Exam  Physical Exam  Vitals and nursing note reviewed  Constitutional:       Appearance: Normal appearance  HENT:      Nose: Nose normal    Eyes:      Conjunctiva/sclera: Conjunctivae normal    Cardiovascular:      Rate and Rhythm: Regular rhythm  Tachycardia present  Heart sounds: No murmur heard  Pulmonary:      Effort: Pulmonary effort is normal       Breath sounds: Normal breath sounds     Abdominal:      General: Abdomen is flat  Musculoskeletal:      Cervical back: Normal range of motion  Right knee: Effusion and erythema present  No bony tenderness or crepitus  Decreased range of motion  Tenderness present  No medial joint line, lateral joint line, MCL, LCL, ACL, PCL or patellar tendon tenderness  No LCL laxity, MCL laxity, ACL laxity or PCL laxity  Instability Tests: Anterior drawer test negative  Posterior drawer test negative  Anterior Lachman test negative  Left knee: Normal         Legs:    Skin:     General: Skin is warm  Capillary Refill: Capillary refill takes less than 2 seconds  Neurological:      General: No focal deficit present  Mental Status: He is alert and oriented to person, place, and time     Psychiatric:         Mood and Affect: Mood normal          Vital Signs  ED Triage Vitals [06/16/21 2058]   Temperature Pulse Respirations Blood Pressure SpO2   98 °F (36 7 °C) 89 20 107/81 100 %      Temp Source Heart Rate Source Patient Position - Orthostatic VS BP Location FiO2 (%)   Temporal Monitor Sitting Right arm --      Pain Score       9           Vitals:    06/16/21 2058 06/16/21 2100 06/16/21 2130   BP: 107/81 112/83 118/88   Pulse: 89 99 104   Patient Position - Orthostatic VS: Sitting Sitting Sitting         Visual Acuity      ED Medications  Medications   ketorolac (TORADOL) injection 30 mg (30 mg Intravenous Given 6/16/21 2120)       Diagnostic Studies  Results Reviewed     Procedure Component Value Units Date/Time    Basic metabolic panel [[id number]] Collected: 06/16/21 2120    Lab Status: Final result Specimen: Blood from Arm, Left Updated: 06/16/21 2148     Sodium 138 mmol/L      Potassium 3 7 mmol/L      Chloride 101 mmol/L      CO2 31 mmol/L      ANION GAP 6 mmol/L      BUN 11 mg/dL      Creatinine 0 67 mg/dL      Glucose 98 mg/dL      Calcium 9 1 mg/dL      eGFR 124 ml/min/1 73sq m     Narrative:      [place] guidelines for Chronic Kidney Disease (CKD):     Stage 1 with normal or high GFR (GFR > 90 mL/min/1 73 square meters)    Stage 2 Mild CKD (GFR = 60-89 mL/min/1 73 square meters)    Stage 3A Moderate CKD (GFR = 45-59 mL/min/1 73 square meters)    Stage 3B Moderate CKD (GFR = 30-44 mL/min/1 73 square meters)    Stage 4 Severe CKD (GFR = 15-29 mL/min/1 73 square meters)    Stage 5 End Stage CKD (GFR <15 mL/min/1 73 square meters)  Note: GFR calculation is accurate only with a steady state creatinine    Uric acid [[id number]]  (Abnormal) Collected: 06/16/21 2120    Lab Status: Final result Specimen: Blood from Arm, Left Updated: 06/16/21 2148     Uric Acid 3 7 mg/dL     CBC and differential [[id number]]  (Abnormal) Collected: 06/16/21 2120    Lab Status: Final result Specimen: Blood from Arm, Left Updated: 06/16/21 2131     WBC 8 45 Thousand/uL      RBC 3 96 Million/uL      Hemoglobin 11 7 g/dL      Hematocrit 36 0 %      MCV 91 fL      MCH 29 5 pg      MCHC 32 5 g/dL      RDW 12 3 %      MPV 9 5 fL      Platelets 270 Thousands/uL      nRBC 0 /100 WBCs      Neutrophils Relative 64 %      Immat GRANS % 0 %      Lymphocytes Relative 23 %      Monocytes Relative 11 %      Eosinophils Relative 2 %      Basophils Relative 0 %      Neutrophils Absolute 5 44 Thousands/µL      Immature Grans Absolute 0 02 Thousand/uL      Lymphocytes Absolute 1 91 Thousands/µL      Monocytes Absolute 0 92 Thousand/µL      Eosinophils Absolute 0 14 Thousand/µL      Basophils Absolute 0 02 Thousands/µL                  No orders to display              Procedures  Procedures         ED Course  ED Course as of Jun 16 2207   Wed Jun 16, 2021   2154 WBC: 8 45   2154 D/w pt bursitiis is likely inflammatory but could be infectious vs gout/pseudogout  Discussed possible aspiration of fluid and risk/benefit  Pt declines fluid aspiration at this time and would like to try knee immobilizer w/ ortho f/u    Will dc w/ immobilizer, nsaids and ortho f/u   URIC ACID(!): 3 7 SBIRT 20yo+      Most Recent Value   SBIRT (24 yo +)   In order to provide better care to our patients, we are screening all of our patients for alcohol and drug use  Would it be okay to ask you these screening questions? Yes Filed at: 06/16/2021 2102   Initial Alcohol Screen: US AUDIT-C    1  How often do you have a drink containing alcohol?  0 Filed at: 06/16/2021 2102   2  How many drinks containing alcohol do you have on a typical day you are drinking? 0 Filed at: 06/16/2021 2102   3a  Male UNDER 65: How often do you have five or more drinks on one occasion? 0 Filed at: 06/16/2021 2102   3b  FEMALE Any Age, or MALE 65+: How often do you have 4 or more drinks on one occassion? 0 Filed at: 06/16/2021 2102   Audit-C Score  0 Filed at: 06/16/2021 2102   JOHN: How many times in the past year have you    Used an illegal drug or used a prescription medication for non-medical reasons? Never Filed at: 06/16/2021 2102                    MDM  Number of Diagnoses or Management Options  Suprapatellar bursitis of right knee: new and requires workup     Amount and/or Complexity of Data Reviewed  Clinical lab tests: reviewed and ordered        Disposition  Final diagnoses:   Suprapatellar bursitis of right knee     Time reflects when diagnosis was documented in both MDM as applicable and the Disposition within this note     Time User Action Codes Description Comment    6/16/2021  9:56 PM Chana Queen Add [M70 51] Suprapatellar bursitis of right knee       ED Disposition     ED Disposition Condition Date/Time Comment    Discharge Stable Wed Jun 16, 2021  9:56 PM Aury Kasper discharge to home/self care              Follow-up Information     Follow up With Specialties Details Why Contact Info Additional [address] [facility] Call on 6/17/2021 to schedule a follow up appointment for further evaluation and treatment Akua Saldaña Sammie [facility] [phone] [address] [facility] [place], [address] [place] [address], [facility] [address]          Patient's Medications   Discharge Prescriptions    DICLOFENAC-MISOPROSTOL (ARTHROTEC 75) 75-0 2 MG PER TABLET    Take 1 tablet by mouth 2 (two) times a day as needed (pain)       Start Date: 6/16/2021 End Date: --       Order Dose: 1 tablet       Quantity: 30 tablet    Refills: 0     No discharge procedures on file      PDMP Review     None          ED Provider  Electronically Signed by           Reji Gutierrez DO  06/16/21 3946

## 2021-06-29 ENCOUNTER — APPOINTMENT (OUTPATIENT)
Dept: RADIOLOGY | Facility: CLINIC | Age: 36
End: 2021-06-29
Payer: COMMERCIAL

## 2021-06-29 ENCOUNTER — OFFICE VISIT (OUTPATIENT)
Dept: OBGYN CLINIC | Facility: CLINIC | Age: 36
End: 2021-06-29
Payer: COMMERCIAL

## 2021-06-29 VITALS
DIASTOLIC BLOOD PRESSURE: 80 MMHG | BODY MASS INDEX: 20.99 KG/M2 | WEIGHT: 155 LBS | HEIGHT: 72 IN | SYSTOLIC BLOOD PRESSURE: 118 MMHG

## 2021-06-29 DIAGNOSIS — M25.561 RIGHT KNEE PAIN, UNSPECIFIED CHRONICITY: ICD-10-CM

## 2021-06-29 DIAGNOSIS — M76.891 TENDINITIS OF RIGHT QUADRICEPS TENDON: Primary | ICD-10-CM

## 2021-06-29 PROCEDURE — 3008F BODY MASS INDEX DOCD: CPT | Performed by: ORTHOPAEDIC SURGERY

## 2021-06-29 PROCEDURE — 99213 OFFICE O/P EST LOW 20 MIN: CPT | Performed by: ORTHOPAEDIC SURGERY

## 2021-06-29 PROCEDURE — 73564 X-RAY EXAM KNEE 4 OR MORE: CPT

## 2021-06-29 NOTE — LETTER
June 29, 2021     Patient: Flip Magallanes   YOB: 1985   Date of Visit: 6/29/2021       To Whom it May Concern:    Flip Magallanes is under my professional care  He was seen in my office on 6/29/2021  He can return to work for sedentary work only  No climbing or squatting  He will return in 4-6 weeks for re-evaluation  If you have any questions or concerns, please don't hesitate to call           Sincerely,          Alessandra Gonzalez MD        CC: No Recipients

## 2021-06-29 NOTE — PROGRESS NOTES
Assessment:     1  Tendinitis of right quadriceps tendon    2  Right knee pain, unspecified chronicity        Plan:      Problem List Items Addressed This Visit        Musculoskeletal and Integument    Tendinitis of right quadriceps tendon - Primary     Findings consistent with right quadriceps tendinitis  Discussed findings and treatment options with the patient  I reviewed patient's right knee x-ray with him  I discussed prognosis of his knee condition  Patient was provided with a knee immobilizer and instructed to wear the knee immobilizer for all weight-bearing activities  He was also provided with a note for work stating he should be on sedentary duty only, no climbing or squatting  He is instructed to avoid any knee flexion  He will follow-up in 4-6 weeks at that time we will consider formal physical therapy  All patient's questions were answered to his satisfaction  This note is created using dictation transcription  It may contain typographical errors, grammatical errors, improperly dictated words, background noise and other errors  Relevant Orders    Durable Medical Equipment      Other Visit Diagnoses     Right knee pain, unspecified chronicity        Relevant Orders    XR knee 4+ vw right injury         Subjective:     Patient ID: Yen Maya is a 29 y o  male  Chief Complaint:    Presents for initial evaluation of the right knee pain  He states about 1 week ago he was working three 12 hour shifts in a row, the day after his 3rd shift he started to begin increased right knee pain  He denies any injury while at work  He did present to the emergency department where they told him he had suprapatella bursitis  He states  His right knee swelling has gone away  He does still experience right knee pain with the more activity he does throughout the day  He denies any previous injuries to his right knee  He denies any further injury or trauma to his right knee    Denies any distal paresthesias  Information on patient's intake form was reviewed  Allergy:  Allergies   Allergen Reactions    Penicillins      Medications:  all current active meds have been reviewed  Past Medical History:  Past Medical History:   Diagnosis Date    History of substance use      Past Surgical History:  History reviewed  No pertinent surgical history  Family History:  Family History   Problem Relation Age of Onset    Diabetes Mother     Hypertension Mother     Brain cancer Mother      Social History:  Social History     Substance and Sexual Activity   Alcohol Use No     Social History     Substance and Sexual Activity   Drug Use No     Social History     Tobacco Use   Smoking Status Current Every Day Smoker    Packs/day: 0 25    Types: Cigarettes   Smokeless Tobacco Never Used     Review of Systems   Constitutional: Negative for chills, fever and unexpected weight change  HENT: Negative for hearing loss, nosebleeds and sore throat  Eyes: Negative for pain, redness and visual disturbance  Respiratory: Negative for cough, shortness of breath and wheezing  Cardiovascular: Negative for chest pain, palpitations and leg swelling  Gastrointestinal: Negative for abdominal pain, nausea and vomiting  Endocrine: Negative for polyphagia and polyuria  Genitourinary: Negative for dysuria and hematuria  Musculoskeletal: Positive for arthralgias (Right knee), gait problem (Antalgic) and joint swelling (Right knee)  See HPI, Pain with walking   Skin: Negative for rash and wound  Neurological: Negative for dizziness, numbness and headaches  Psychiatric/Behavioral: Negative for decreased concentration and suicidal ideas  The patient is not nervous/anxious            Objective:  BP Readings from Last 1 Encounters:   06/29/21 118/80      Wt Readings from Last 1 Encounters:   06/29/21 70 3 kg (155 lb)      BMI:   Estimated body mass index is 21 02 kg/m² as calculated from the following:    Height as of this encounter: 6' (1 829 m)  Weight as of this encounter: 70 3 kg (155 lb)  BSA:   Estimated body surface area is 1 91 meters squared as calculated from the following:    Height as of this encounter: 6' (1 829 m)  Weight as of this encounter: 70 3 kg (155 lb)  Physical Exam  Vitals and nursing note reviewed  Constitutional:       Appearance: Normal appearance  He is well-developed  HENT:      Head: Normocephalic and atraumatic  Right Ear: External ear normal       Left Ear: External ear normal    Eyes:      Extraocular Movements: Extraocular movements intact  Conjunctiva/sclera: Conjunctivae normal    Pulmonary:      Effort: Pulmonary effort is normal    Musculoskeletal:      Cervical back: Neck supple  Right knee: No effusion  Instability Tests: Medial Lu test negative and lateral Lu test negative  Skin:     General: Skin is warm  Neurological:      Mental Status: He is alert and oriented to person, place, and time  Psychiatric:         Mood and Affect: Mood normal          Behavior: Behavior normal        Right Knee Exam     Tenderness   Right knee tenderness location: Quadriceps tendon insertion  Range of Motion   Extension: 5 (Pain)   Flexion: 130     Tests   Lu:  Medial - negative Lateral - negative  Varus: negative Valgus: negative  Patellar apprehension: negative    Other   Erythema: absent  Scars: absent  Sensation: normal  Pulse: present  Swelling: none  Effusion: no effusion present    Comments:    Pain with resistant knee extension            I have personally reviewed pertinent films in PACS and my interpretation is No dislocation, fracture or other obvious osseous abnormalities  Jade Cueva

## 2021-06-29 NOTE — ASSESSMENT & PLAN NOTE
Findings consistent with right quadriceps tendinitis  Discussed findings and treatment options with the patient  I reviewed patient's right knee x-ray with him  I discussed prognosis of his knee condition  Patient was provided with a knee immobilizer and instructed to wear the knee immobilizer for all weight-bearing activities  He was also provided with a note for work stating he should be on sedentary duty only, no climbing or squatting  He is instructed to avoid any knee flexion  He will follow-up in 4-6 weeks at that time we will consider formal physical therapy  All patient's questions were answered to his satisfaction  This note is created using dictation transcription  It may contain typographical errors, grammatical errors, improperly dictated words, background noise and other errors

## 2021-08-06 ENCOUNTER — HOSPITAL ENCOUNTER (OUTPATIENT)
Dept: ULTRASOUND IMAGING | Facility: HOSPITAL | Age: 36
Discharge: HOME | End: 2021-08-06
Payer: COMMERCIAL

## 2021-08-06 DIAGNOSIS — E04.1 NONTOXIC SINGLE THYROID NODULE: ICD-10-CM

## 2021-08-06 PROCEDURE — 76536 US EXAM OF HEAD AND NECK: CPT

## 2021-08-10 ENCOUNTER — OFFICE VISIT (OUTPATIENT)
Dept: OBGYN CLINIC | Facility: CLINIC | Age: 36
End: 2021-08-10
Payer: COMMERCIAL

## 2021-08-10 VITALS
BODY MASS INDEX: 22.08 KG/M2 | DIASTOLIC BLOOD PRESSURE: 80 MMHG | HEIGHT: 72 IN | SYSTOLIC BLOOD PRESSURE: 118 MMHG | WEIGHT: 163 LBS

## 2021-08-10 DIAGNOSIS — M76.891 TENDINITIS OF RIGHT QUADRICEPS TENDON: Primary | ICD-10-CM

## 2021-08-10 PROCEDURE — 99213 OFFICE O/P EST LOW 20 MIN: CPT | Performed by: ORTHOPAEDIC SURGERY

## 2021-08-10 PROCEDURE — 3008F BODY MASS INDEX DOCD: CPT | Performed by: ORTHOPAEDIC SURGERY

## 2021-08-10 NOTE — ASSESSMENT & PLAN NOTE
Findings consistent with right distal quadriceps tendinitis- resolved  Findings and treatment options were discussed the patient  He is doing excellent  Recommend low-impact exercises at this time such as stationary bicycle or swimming  He may return to work tomorrow with no restrictions  Work note was given  Follow-up as needed if any problems arise  All questions were answered to patient's satisfaction

## 2021-08-10 NOTE — LETTER
August 10, 2021     Patient: Jeff Kearney   YOB: 1985   Date of Visit: 8/10/2021       To Whom it May Concern:    Jeff Kearney is under my professional care  He was seen in my office on 8/10/2021  He can return to work on 8/11/21 with no restrictions  If you have any questions or concerns, please don't hesitate to call           Sincerely,          Lory Griffin MD        CC: No Recipients

## 2021-08-10 NOTE — PROGRESS NOTES
Assessment:     1  Tendinitis of right quadriceps tendon        Plan:     The patient was seen and examined by Dr Kathryn Gomez and myself  Problem List Items Addressed This Visit        Musculoskeletal and Integument    Tendinitis of right quadriceps tendon - Primary     Findings consistent with right distal quadriceps tendinitis- resolved  Findings and treatment options were discussed the patient  He is doing excellent  Recommend low-impact exercises at this time such as stationary bicycle or swimming  He may return to work tomorrow with no restrictions  Work note was given  Follow-up as needed if any problems arise  All questions were answered to patient's satisfaction  Subjective:     Patient ID: Emerald Engel is a 29 y o  male  Chief Complaint: This is a 20-year-old white male following up for right distal quadriceps tendinitis  He was last seen about 5 weeks ago and was placed in a knee immobilizer  He wore it for about 4 weeks and then stopped since he was feeling much better  He states he no longer has any pain  He is able to do his normal activities without any discomfort  He would like to return to work tomorrow  Allergy:  Allergies   Allergen Reactions    Penicillins      Medications:  all current active meds have been reviewed  Past Medical History:  Past Medical History:   Diagnosis Date    History of substance use      Past Surgical History:  History reviewed  No pertinent surgical history    Family History:  Family History   Problem Relation Age of Onset    Diabetes Mother     Hypertension Mother     Brain cancer Mother      Social History:  Social History     Substance and Sexual Activity   Alcohol Use No     Social History     Substance and Sexual Activity   Drug Use No     Social History     Tobacco Use   Smoking Status Current Every Day Smoker    Packs/day: 0 25    Types: Cigarettes   Smokeless Tobacco Never Used     Review of Systems   Constitutional: Negative for chills, fever and unexpected weight change  HENT: Negative for hearing loss, nosebleeds and sore throat  Eyes: Negative for pain, redness and visual disturbance  Respiratory: Negative for cough, shortness of breath and wheezing  Cardiovascular: Negative for chest pain, palpitations and leg swelling  Gastrointestinal: Negative for abdominal pain, nausea and vomiting  Endocrine: Negative for polyphagia and polyuria  Genitourinary: Negative for dysuria and hematuria  Musculoskeletal: Negative for arthralgias (Right knee), gait problem and joint swelling (Right knee)  Skin: Negative for rash and wound  Neurological: Negative for dizziness, numbness and headaches  Psychiatric/Behavioral: Negative for decreased concentration and suicidal ideas  The patient is not nervous/anxious  Objective:  BP Readings from Last 1 Encounters:   08/10/21 118/80      Wt Readings from Last 1 Encounters:   08/10/21 73 9 kg (163 lb)      BMI:   Estimated body mass index is 22 11 kg/m² as calculated from the following:    Height as of this encounter: 6' (1 829 m)  Weight as of this encounter: 73 9 kg (163 lb)  BSA:   Estimated body surface area is 1 95 meters squared as calculated from the following:    Height as of this encounter: 6' (1 829 m)  Weight as of this encounter: 73 9 kg (163 lb)  Physical Exam  Vitals and nursing note reviewed  Constitutional:       Appearance: Normal appearance  He is well-developed  HENT:      Head: Normocephalic and atraumatic  Right Ear: External ear normal       Left Ear: External ear normal    Eyes:      Extraocular Movements: Extraocular movements intact  Conjunctiva/sclera: Conjunctivae normal    Pulmonary:      Effort: Pulmonary effort is normal    Musculoskeletal:      Cervical back: Neck supple  Right knee: No effusion  Instability Tests: Medial Lu test negative and lateral Lu test negative     Skin:     General: Skin is warm    Neurological:      Mental Status: He is alert and oriented to person, place, and time  Psychiatric:         Mood and Affect: Mood normal          Behavior: Behavior normal        Right Knee Exam     Muscle Strength   The patient has normal right knee strength  Tenderness   The patient is experiencing no tenderness  Range of Motion   The patient has normal right knee ROM  Tests   Lu:  Medial - negative Lateral - negative  Varus: negative Valgus: negative  Patellar apprehension: negative    Other   Erythema: absent  Scars: absent  Sensation: normal  Pulse: present  Swelling: none  Effusion: no effusion present    Comments:  No pain with resistant knee extension            No new imaging

## 2021-08-13 ENCOUNTER — TELEPHONE (OUTPATIENT)
Dept: ENDOCRINOLOGY | Facility: HOSPITAL | Age: 36
End: 2021-08-13

## 2021-08-13 NOTE — TELEPHONE ENCOUNTER
New patient appointment scheduled for 11-1-21  Thyroid ultrasound results are in Epic  Have requested records from PCP  Can you review the ultrasound and let me know how soon he should be seen and/or whether you think his PCP should order a biopsy now? I already have him on the cancellation list     Told patient I'd call him back after you review this

## 2021-08-19 ENCOUNTER — OFFICE VISIT (OUTPATIENT)
Dept: ENDOCRINOLOGY | Facility: HOSPITAL | Age: 36
End: 2021-08-19
Payer: COMMERCIAL

## 2021-08-19 VITALS
SYSTOLIC BLOOD PRESSURE: 120 MMHG | WEIGHT: 162.8 LBS | DIASTOLIC BLOOD PRESSURE: 80 MMHG | HEIGHT: 72 IN | HEART RATE: 104 BPM | BODY MASS INDEX: 22.05 KG/M2

## 2021-08-19 DIAGNOSIS — E04.1 LEFT THYROID NODULE: Primary | ICD-10-CM

## 2021-08-19 PROCEDURE — 99204 OFFICE O/P NEW MOD 45 MIN: CPT | Performed by: INTERNAL MEDICINE

## 2021-08-19 PROCEDURE — 3008F BODY MASS INDEX DOCD: CPT | Performed by: INTERNAL MEDICINE

## 2021-08-19 NOTE — PROGRESS NOTES
8/20/2021    Assessment/Plan      Diagnoses and all orders for this visit:    Left thyroid nodule  -     Thyroid Antibodies Panel Lab Collect  -     T4, free Lab Collect  -     TSH, 3rd generation Lab Collect  -     US guided thyroid biopsy with AFIRMA; Future        Assessment/Plan:   Left-sided thyroid nodule  He has a newly discovered left-sided thyroid nodule which is a solitary dominant nodule  It is at risk for thyroid cancer, particularly since he is a male  At this point based on his ultrasound and the fact that his nodule is T rad 4, I have asked him to get a fine-needle aspiration biopsy of that thyroid nodule under ultrasound guidance  I have asked the radiologist to take an extra sample for Afirma testing if he has a pathology report that says he has Celeste class 3 or 4, cellular atypia or follicular lesion of unclear significance  He was asked to call about a week afterwards for the results but does know that the Afirma testing could take an extra couple weeks  I have asked him to get a TSH with free T4 and thyroid antibodies done now  Follow-up will be determined based on the blood work and thyroid nodule biopsy  CC:   Thyroid nodule consult    History of Present Illness     HPI: Shayy Acevedo is a 29y o  year old male with history of recently discovered thyroid nodule in the left lobe for evaluation/ consult  He reports that he went in for 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 and his neck exam felt abnormal as if there was a nodule  An ultrasound was done showing a left-sided thyroid nodule and he was referred to endocrine for further evaluation  He also reports some difficulty with swallowing he has to take smaller bites sent pieces of food in order to swallow certain harder foods and meats  Of note, he has no family history of thyroid disease  He reports fatigued  He is more cold in general but has no heat or cold intolerance    He has occasional dry skin but no brittle nails or hair loss  He denies palpitation, tremors, diarrhea or constipation, anxiety or depression  Weight is stable  He denies insomnia  He has no diplopia  He has no history of head or neck irradiation in the past     Review of Systems   Constitutional: Positive for fatigue  Negative for unexpected weight change  HENT: Positive for trouble swallowing  Negative for hearing loss and tinnitus  No XRT to the head or neck in the past Has to take bites/pieces of foods to be able to swallow, mostly harder foods and meats  Eyes: Negative for visual disturbance  No diplopia  Wears glasses  Respiratory: Negative for chest tightness and shortness of breath  Cardiovascular: Negative for chest pain, palpitations and leg swelling  Gastrointestinal: Negative for abdominal pain, constipation, diarrhea and nausea  Endocrine: Negative for cold intolerance and heat intolerance  More cold in general    Musculoskeletal: Positive for arthralgias  Negative for back pain  Recent right knee bursitis, doing ok now  Skin: Negative for rash  Occasional dry skin  No brittle nails  No hair loss  Neurological: Positive for headaches  Negative for dizziness, tremors, light-headedness and numbness  Having some intermittent headaches at night on and off for the last several months  Psychiatric/Behavioral: Negative for dysphoric mood and sleep disturbance  The patient is not nervous/anxious  Historical Information   Past Medical History:   Diagnosis Date    ADD (attention deficit disorder)     History of substance use      No past surgical history on file    Social History   Social History     Substance and Sexual Activity   Alcohol Use No     Social History     Substance and Sexual Activity   Drug Use No    Comment: past oxycodone and heroin abuse / clean for 4 years     Social History     Tobacco Use   Smoking Status Current Every Day Smoker    Packs/day: 0 25    Types: Cigarettes   Smokeless Tobacco Never Used   Tobacco Comment    smokes about 2 cigarettes a day     Family History:   Family History   Problem Relation Age of Onset    Diabetes Mother     Hypertension Mother    [facility] Brain cancer Mother     Obesity Mother     Prostate cancer Father     Colon cancer Father     Emphysema Father     No Known Problems Sister     No Known Problems Sister     Autism Son     No Known Problems Daughter        Meds/Allergies   Current Outpatient Medications   Medication Sig Dispense Refill    amphetamine-dextroamphetamine (ADDERALL) 30 MG tablet 30 mg 2 (two) times a day       buprenorphine-naloxone (SUBOXONE) 8-2 mg per SL tablet 3 (three) times a day        No current facility-administered medications for this visit  Allergies   Allergen Reactions    Penicillins Other (See Comments)     Mother told him he had allergic reaction in hospital with pneumonia as a child       Objective   Vitals: Blood pressure 120/80, pulse 104, height 6' (1 829 m), weight 73 8 kg (162 lb 12 8 oz)  Invasive Devices     None                 Physical Exam  Vitals reviewed  Constitutional:       Appearance: Normal appearance  He is well-developed and normal weight  HENT:      Head: Normocephalic and atraumatic  Eyes:      Extraocular Movements: Extraocular movements intact  Conjunctiva/sclera: Conjunctivae normal       Comments: No lid lag, stare, proptosis, or periorbital edema  Neck:      Thyroid: No thyromegaly  Vascular: No carotid bruit  Comments: 1-2 cm left-sided thyroid nodule palpable  The rest of the thyroid is normal in size  No bruits over the thyroid gland  Cardiovascular:      Rate and Rhythm: Normal rate and regular rhythm  Heart sounds: Normal heart sounds  No murmur heard  Pulmonary:      Effort: Pulmonary effort is normal       Breath sounds: Normal breath sounds  No wheezing     Abdominal:      General: Bowel sounds are normal       Palpations: Abdomen is soft  Tenderness: There is no abdominal tenderness  Musculoskeletal:         General: No deformity  Normal range of motion  Cervical back: Normal range of motion and neck supple  Right lower leg: No edema  Left lower leg: No edema  Comments: No tremor of the outstretched hands  No spinous process tenderness  No CVA tenderness  Lymphadenopathy:      Cervical: No cervical adenopathy  Skin:     General: Skin is warm and dry  Findings: No erythema or rash  Neurological:      Mental Status: He is alert and oriented to person, place, and time  Deep Tendon Reflexes: Reflexes are normal and symmetric  Comments: Deep tendon reflexes normal          The history was obtained from the review of the chart and from the patient  Lab Results:      Lab Results   Component Value Date    CREATININE 0 67 06/16/2021    CREATININE 0 84 08/23/2016    BUN 11 06/16/2021    K 3 7 06/16/2021     06/16/2021    CO2 31 06/16/2021     eGFR   Date Value Ref Range Status   06/16/2021 124 ml/min/1 73sq m Final     Lab Results   Component Value Date    ALT 16 08/23/2016    AST 14 08/23/2016    ALKPHOS 60 08/23/2016       Thyroid ultrasound:     THYROID ULTRASOUND Performed on 08/06/2021     INDICATION:    E04 1: Nontoxic single thyroid nodule      COMPARISON:  None     TECHNIQUE:   Ultrasound of the thyroid was performed with a high frequency linear transducer in transverse and sagittal planes including volumetric imaging sweeps as well as traditional still imaging technique      FINDINGS:  Normal homogeneous smooth echotexture      Right lobe:  5 8 x 1 4 x 1 9 cm  Left lobe:  5 0 x 1 4 x 1 5 cm  Isthmus:  0 1 cm      Nodule #1  Image 30  LEFT upper pole nodule measuring 1 6 x 1 0 x 1 4 cm  COMPOSITION:  2 points, solid or almost completely solid   ECHOGENICITY:  2 points, hypoechoic  SHAPE:  0 points, wider-than-tall      MARGIN: 0 points, smooth  ECHOGENIC FOCI:  0 points, none or large comet-tail artifacts  TI-RADS Classification: TR 4 (4-6 points), FNA if > 1 5 cm  Follow if > 1cm      There are additional nodules of lesser size and/or TI-RADS score  These do not necessitate additional evaluation based on ACR criteria       IMPRESSION:     The following meet current ACR criteria for recommending ultrasound guided biopsy:         The 1 6 cm left upper pole nodule  (Image number 30) (CRITERIA: TR 4, Moderately suspicious    FNA if > 1 5 cm        Future Appointments   Date Time Provider Rachna Barajas   8/27/2021  2:00 PM [place] [address]

## 2021-08-19 NOTE — PATIENT INSTRUCTIONS
Let's get blood work done now  You will need a thyroid nodule fine needle aspiration biopsy  Call within 1 week for the results  Follow up to be determined  Thyroid Nodules   WHAT YOU NEED TO KNOW:   What are thyroid nodules? Thyroid nodules are growths on your thyroid gland  Your thyroid makes hormones that help control your body temperature, heart rate, and growth  The hormones also control how fast your body uses food for energy  Some nodules are lumps of tissue, and others are filled with fluid  What increases my risk for thyroid nodules? A lack of iodine in the foods you eat is the most common cause of thyroid nodules  The following may increase your risk:  · Autoimmune thyroid disorders, such as Hashimoto disease    · Medical conditions, such as cancer, a thyroid infection, thyroid goiter, or a thyroid cyst    · A family history  of thyroid nodules or thyroid cancer    · Pregnancy  that causes your body to create more hormones    · Past radiation  treatment to your head or neck    What are the signs and symptoms of thyroid nodules? A small nodule may have no signs or symptoms  As your nodule grows, you may be able to see a lump on your neck  A large nodule may press on your airway or neck veins and cause the following:  · A cough or choking and hoarse voice    · Flushed face and swollen neck or neck veins    · Noisy, high-pitched breathing    · Pain when you swallow or trouble swallowing    · Trouble breathing when you lie down    How are thyroid nodules diagnosed? · Blood tests  are done to check the level of thyroid hormones in your body  A blood test may also show if you have an autoimmune disease that caused your nodules  · An ultrasound  uses sound waves to show pictures of your thyroid on a screen  · A fine-needle biopsy  is done to get a tissue sample from your thyroid gland to be tested  How are thyroid nodules treated?    · Thyroid medicine  is given to bring your thyroid hormone levels back to a normal range  · Radioactive iodine  is given to damage cells in your thyroid gland and decrease the size of your nodules  · Laser ablation  is done to make your nodules smaller  Ask for more information about laser ablation  · Surgery  may be done to remove all or part of your thyroid gland  Surgery is done if your nodules are cancerous  Ask for more information about thyroid surgery  What can I do to manage my thyroid nodules? · Eat iodine-rich foods  Examples include fish, seaweed, dairy products, eggs, beans, and lean meat  Iodized salt also contains iodine  You may need to use iodized table salt when you cook and season your food  Iodine may be added to bread or to your drinking water  Ask for a list of foods that contain iodine, and ask how much iodine you need each day  · Go to follow-up appointments  Write down your questions so you remember to ask them during your visits  When should I contact my healthcare provider? · You have a new cough that does not improve  · You begin choking or have new or increased trouble swallowing  · Your voice becomes hoarse  · You are losing weight without trying  · You have questions or concerns about your condition or care  When should I seek immediate care or call 911? · You have sudden chest pain or trouble breathing  · Your symptoms worsen, even after you take your medicines  CARE AGREEMENT:   You have the right to help plan your care  Learn about your health condition and how it may be treated  Discuss treatment options with your healthcare providers to decide what care you want to receive  You always have the right to refuse treatment  The above information is an  only  It is not intended as medical advice for individual conditions or treatments  Talk to your doctor, nurse or pharmacist before following any medical regimen to see if it is safe and effective for you    © Copyright [employer] 2021 Information is for [employer] use only and may not be sold, redistributed or otherwise used for commercial purposes   All illustrations and images included in CareNotes® are the copyrighted property of [employer] Inc  or [facility] Neno Griggs

## 2021-08-20 NOTE — NURSING NOTE
Call placed to patient to discuss upcoming US guided thyroid biopsy with Arnold at [facility]  Allergies reviewed and verified that patient does not currently take any anticoagulant medications  Pre procedure instructions including diet and taking own medications discussed with patient  Patient instructed that he may eat normally and take medications as usual before the procedure  Procedure and post procedure expectations and instructions reviewed with the patient  Patient verbalizes understanding  Per patient no further questions at this time  Reminded of the location, date and time of the expected procedure  Encouraged to call back with any further questions

## 2021-08-27 ENCOUNTER — HOSPITAL ENCOUNTER (OUTPATIENT)
Dept: ULTRASOUND IMAGING | Facility: HOSPITAL | Age: 36
Discharge: HOME | End: 2021-08-27
Admitting: RADIOLOGY
Payer: COMMERCIAL

## 2021-08-27 DIAGNOSIS — E04.1 LEFT THYROID NODULE: ICD-10-CM

## 2021-08-27 PROCEDURE — 88185 FLOWCYTOMETRY/TC ADD-ON: CPT

## 2021-08-27 PROCEDURE — 88184 FLOWCYTOMETRY/ TC 1 MARKER: CPT | Performed by: INTERNAL MEDICINE

## 2021-08-27 PROCEDURE — 10005 FNA BX W/US GDN 1ST LES: CPT

## 2021-08-27 PROCEDURE — 88173 CYTOPATH EVAL FNA REPORT: CPT | Performed by: PATHOLOGY

## 2021-08-27 RX ORDER — LIDOCAINE HYDROCHLORIDE 10 MG/ML
5 INJECTION, SOLUTION EPIDURAL; INFILTRATION; INTRACAUDAL; PERINEURAL ONCE
Status: COMPLETED | OUTPATIENT
Start: 2021-08-27 | End: 2021-08-27

## 2021-08-27 RX ADMIN — LIDOCAINE HYDROCHLORIDE 5 ML: 10 INJECTION, SOLUTION EPIDURAL; INFILTRATION; INTRACAUDAL; PERINEURAL at 14:41

## 2021-09-02 LAB — SCAN RESULT: NORMAL

## 2021-11-29 DIAGNOSIS — E04.1 LEFT THYROID NODULE: Primary | ICD-10-CM

## 2024-04-12 NOTE — PROGRESS NOTES
Assessment:     1  Right shoulder tendonitis    2  Acute pain of right shoulder        Plan:     Problem List Items Addressed This Visit        Musculoskeletal and Integument    Right shoulder tendonitis - Primary     After reviewing his imaging and performing a thorough history and physical exam, I explained to Corinne Dawson that he does appear to be suffering with right shoulder tendinitis  We reviewed his MRI together and I explained that his rotator cuff, biceps tendon, and labrum appear intact, but he does have some degenerative cyst developing over the anterior superior aspect of the humerus head which most consistent with his prior intermittent impingement symptoms  I did also offer him a corticosteroid injection for symptomatic relief  After discussing the risks and benefits, he elected to proceed  The injection was administered as documented below and was well tolerated by the patient  Patient appeared to have good pain relief after the injection  Post injection instructions were provided  We discussed treatment options and I recommended initiating formal therapy  I provided him with a referral for this today in the office  I also recommended he continue using over-the-counter NSAIDs and Voltaren gel as needed for pain  He may continue working on light duty status but should avoid repetitive right upper extremity activities, particularly overhead activities  A work note was provided for his employer  We will see him back in 4 weeks for repeat clinical evaluation  All patient's questions were answered to his satisfaction  This note is created using dictation transcription  It may contain typographical errors, grammatical errors, improperly dictated words, background noise and other errors           Relevant Medications    lidocaine (PF) (XYLOCAINE-MPF) 1 % injection 5 mL (Completed)    betamethasone acetate-betamethasone sodium phosphate (CELESTONE) injection 6 mg (Completed)    Other Relevant Orders Called mom and advised that the sleep study order was placed and that Karolyn would call her once it was completed. Mom verbalized understanding.   Large joint arthrocentesis: R subacromial bursa (Completed)    Ambulatory referral to Physical Therapy      Other Visit Diagnoses     Acute pain of right shoulder        Relevant Medications    lidocaine (PF) (XYLOCAINE-MPF) 1 % injection 5 mL (Completed)    betamethasone acetate-betamethasone sodium phosphate (CELESTONE) injection 6 mg (Completed)    Other Relevant Orders    XR shoulder 2+ vw right    Large joint arthrocentesis: R subacromial bursa (Completed)    Ambulatory referral to Physical Therapy         Subjective:      Patient ID: Anjum Meléndez is a 29 y o  male  Chief Complaint:    Monet Dumont is a 28-year-old male who presents today for initial evaluation of his acute right shoulder pain  This is a worker's compensation case  He states that on March 7 he felt a pop in his shoulder while lifting heavy metal   He was lifting this material below eye level  He states he was able to continue working  The following day, he began to experience sharp pain about the superior aspect of his shoulder  This pain is worse with activity, particularly when moving his elbow away from his side  His pain is better at rest   He has been working on light duty at work  He has been using Tylenol and Voltaren gel which do provide some benefit for him  He does complain of decreased range of motion and function about his shoulder  He denies any distal paresthesias of the upper extremity  His intake form was reviewed today  Allergy:  Allergies   Allergen Reactions    Penicillins      Medications:  all current active meds have been reviewed  Past Medical History:  Past Medical History:   Diagnosis Date    History of substance use      Past Surgical History:  History reviewed  No pertinent surgical history    Family History:  Family History   Problem Relation Age of Onset    Diabetes Mother     Hypertension Mother    Deyanira Nine Brain cancer Mother      Social History:  Social History     Substance and Sexual Activity Alcohol Use No     Social History     Substance and Sexual Activity   Drug Use No     Social History     Tobacco Use   Smoking Status Current Every Day Smoker    Packs/day: 0 25    Types: Cigarettes   Smokeless Tobacco Never Used     Review of Systems   Constitutional: Positive for activity change  Negative for chills, fever and unexpected weight change  HENT: Negative for hearing loss, nosebleeds and sore throat  Eyes: Negative for pain, redness and visual disturbance  Respiratory: Negative for cough, shortness of breath and wheezing  Cardiovascular: Negative for chest pain, palpitations and leg swelling  Gastrointestinal: Negative for abdominal pain, nausea and vomiting  Endocrine: Negative for polyphagia and polyuria  Genitourinary: Negative for dysuria and hematuria  Musculoskeletal: Positive for arthralgias (Right shoulder) and myalgias  Negative for joint swelling and neck pain  See HPI   Skin: Negative for rash and wound  Neurological: Negative for dizziness, numbness and headaches  Psychiatric/Behavioral: Negative for decreased concentration and suicidal ideas  The patient is not nervous/anxious  Objective:  BP Readings from Last 1 Encounters:   04/20/21 130/82      Wt Readings from Last 1 Encounters:   04/20/21 69 9 kg (154 lb)      BMI:   Estimated body mass index is 20 89 kg/m² as calculated from the following:    Height as of this encounter: 6' (1 829 m)  Weight as of this encounter: 69 9 kg (154 lb)  BSA:   Estimated body surface area is 1 91 meters squared as calculated from the following:    Height as of this encounter: 6' (1 829 m)  Weight as of this encounter: 69 9 kg (154 lb)  Physical Exam  Vitals signs and nursing note reviewed  Constitutional:       Appearance: Normal appearance  He is well-developed  HENT:      Head: Normocephalic and atraumatic        Right Ear: External ear normal       Left Ear: External ear normal    Eyes: Extraocular Movements: Extraocular movements intact  Conjunctiva/sclera: Conjunctivae normal    Neck:      Musculoskeletal: Neck supple  Pulmonary:      Effort: Pulmonary effort is normal    Skin:     General: Skin is warm  Neurological:      Mental Status: He is alert and oriented to person, place, and time  Psychiatric:         Mood and Affect: Mood normal          Behavior: Behavior normal        Right Shoulder Exam     Tenderness   The patient is experiencing tenderness in the acromioclavicular joint  Range of Motion   Active abduction: abnormal Right shoulder active abduction: Pain  Passive abduction: abnormal Right shoulder passive abduction: Pain  External rotation: abnormal Right shoulder external rotation: Pain  Forward flexion: abnormal Right shoulder forward flexion: Pain  Internal rotation 0 degrees: abnormal Right shoulder internal rotation 0 degrees: Pain  Muscle Strength   Right shoulder normal muscle strength: Pain with resistant abduction  Abduction: 5/5   Internal rotation: 5/5   External rotation: 5/5   Supraspinatus: 5/5   Biceps: 5/5     Tests   Impingement: positive  Drop arm: negative    Other   Erythema: absent  Scars: absent  Sensation: normal  Pulse: present    Comments:  Supination 5/5  Speed's   Painful  Empty Can : positive                I have personally reviewed pertinent films in PACS and my interpretation is Arthrogram MRI of the right shoulder obtained from an outside source dated March 2021 reviewed demonstrating intact rotator cuff and biceps tendon  There are subcortical cystic changes noted in the anterior humeral head  Labrum appeared to be intact  The area radiologist comment on  most consistent with normal variant  X-ray of the right shoulder obtained on 04/20/2021 reviewed demonstrating no acute fracture, dislocation, lytic or blastic lesion            Large joint arthrocentesis: R subacromial bursa  Universal Protocol:  Consent: Verbal consent obtained  Risks and benefits: risks, benefits and alternatives were discussed  Consent given by: patient  Patient understanding: patient states understanding of the procedure being performed  Site marked: the operative site was marked  Radiology Images displayed and confirmed   If images not available, report reviewed: imaging studies available  Patient identity confirmed: verbally with patient    Supporting Documentation  Indications: pain   Procedure Details  Location: shoulder - R subacromial bursa  Needle size: 22 G  Ultrasound guidance: no  Approach: posterolateral  Medications administered: 5 mL lidocaine (PF) 1 %; 6 mg betamethasone acetate-betamethasone sodium phosphate 6 (3-3) mg/mL    Patient tolerance: patient tolerated the procedure well with no immediate complications  Dressing:  Sterile dressing applied